# Patient Record
Sex: FEMALE | NOT HISPANIC OR LATINO | Employment: FULL TIME | URBAN - METROPOLITAN AREA
[De-identification: names, ages, dates, MRNs, and addresses within clinical notes are randomized per-mention and may not be internally consistent; named-entity substitution may affect disease eponyms.]

---

## 2023-04-03 ENCOUNTER — APPOINTMENT (OUTPATIENT)
Dept: PRIMARY CARE | Facility: CLINIC | Age: 33
End: 2023-04-03

## 2023-05-02 ENCOUNTER — OFFICE VISIT (OUTPATIENT)
Dept: PRIMARY CARE | Facility: CLINIC | Age: 33
End: 2023-05-02
Payer: COMMERCIAL

## 2023-05-02 ENCOUNTER — LAB (OUTPATIENT)
Dept: LAB | Facility: LAB | Age: 33
End: 2023-05-02
Payer: COMMERCIAL

## 2023-05-02 VITALS
WEIGHT: 134.4 LBS | TEMPERATURE: 97.8 F | SYSTOLIC BLOOD PRESSURE: 118 MMHG | HEIGHT: 61 IN | HEART RATE: 67 BPM | DIASTOLIC BLOOD PRESSURE: 80 MMHG | BODY MASS INDEX: 25.37 KG/M2 | OXYGEN SATURATION: 98 %

## 2023-05-02 DIAGNOSIS — Z90.721 S/P REMOVAL OF LEFT OVARY: ICD-10-CM

## 2023-05-02 DIAGNOSIS — Z00.00 HEALTH MAINTENANCE EXAMINATION: ICD-10-CM

## 2023-05-02 DIAGNOSIS — Z87.59 HISTORY OF ECTOPIC PREGNANCY: ICD-10-CM

## 2023-05-02 DIAGNOSIS — Z00.00 HEALTH MAINTENANCE EXAMINATION: Primary | ICD-10-CM

## 2023-05-02 DIAGNOSIS — Z23 NEED FOR TDAP VACCINATION: ICD-10-CM

## 2023-05-02 LAB
ALANINE AMINOTRANSFERASE (SGPT) (U/L) IN SER/PLAS: 11 U/L (ref 7–45)
ALBUMIN (G/DL) IN SER/PLAS: 4.5 G/DL (ref 3.4–5)
ALKALINE PHOSPHATASE (U/L) IN SER/PLAS: 69 U/L (ref 33–110)
ANION GAP IN SER/PLAS: 12 MMOL/L (ref 10–20)
APPEARANCE, URINE: CLEAR
ASPARTATE AMINOTRANSFERASE (SGOT) (U/L) IN SER/PLAS: 14 U/L (ref 9–39)
BASOPHILS (10*3/UL) IN BLOOD BY AUTOMATED COUNT: 0.06 X10E9/L (ref 0–0.1)
BASOPHILS/100 LEUKOCYTES IN BLOOD BY AUTOMATED COUNT: 0.9 % (ref 0–2)
BILIRUBIN TOTAL (MG/DL) IN SER/PLAS: 0.6 MG/DL (ref 0–1.2)
BILIRUBIN, URINE: NEGATIVE
BLOOD, URINE: NEGATIVE
CALCIUM (MG/DL) IN SER/PLAS: 9.9 MG/DL (ref 8.6–10.6)
CARBON DIOXIDE, TOTAL (MMOL/L) IN SER/PLAS: 29 MMOL/L (ref 21–32)
CHLORIDE (MMOL/L) IN SER/PLAS: 103 MMOL/L (ref 98–107)
CHOLESTEROL (MG/DL) IN SER/PLAS: 208 MG/DL (ref 0–199)
CHOLESTEROL IN HDL (MG/DL) IN SER/PLAS: 93.3 MG/DL
CHOLESTEROL/HDL RATIO: 2.2
COLOR, URINE: COLORLESS
CREATININE (MG/DL) IN SER/PLAS: 0.56 MG/DL (ref 0.5–1.05)
EOSINOPHILS (10*3/UL) IN BLOOD BY AUTOMATED COUNT: 0.24 X10E9/L (ref 0–0.7)
EOSINOPHILS/100 LEUKOCYTES IN BLOOD BY AUTOMATED COUNT: 3.4 % (ref 0–6)
ERYTHROCYTE DISTRIBUTION WIDTH (RATIO) BY AUTOMATED COUNT: 12.2 % (ref 11.5–14.5)
ERYTHROCYTE MEAN CORPUSCULAR HEMOGLOBIN CONCENTRATION (G/DL) BY AUTOMATED: 30.7 G/DL (ref 32–36)
ERYTHROCYTE MEAN CORPUSCULAR VOLUME (FL) BY AUTOMATED COUNT: 96 FL (ref 80–100)
ERYTHROCYTES (10*6/UL) IN BLOOD BY AUTOMATED COUNT: 4.49 X10E12/L (ref 4–5.2)
GFR FEMALE: >90 ML/MIN/1.73M2
GLUCOSE (MG/DL) IN SER/PLAS: 82 MG/DL (ref 74–99)
GLUCOSE, URINE: NEGATIVE MG/DL
HEMATOCRIT (%) IN BLOOD BY AUTOMATED COUNT: 43.3 % (ref 36–46)
HEMOGLOBIN (G/DL) IN BLOOD: 13.3 G/DL (ref 12–16)
IMMATURE GRANULOCYTES/100 LEUKOCYTES IN BLOOD BY AUTOMATED COUNT: 0.1 % (ref 0–0.9)
KETONES, URINE: NEGATIVE MG/DL
LDL: 105 MG/DL (ref 0–99)
LEUKOCYTE ESTERASE, URINE: NEGATIVE
LEUKOCYTES (10*3/UL) IN BLOOD BY AUTOMATED COUNT: 7 X10E9/L (ref 4.4–11.3)
LYMPHOCYTES (10*3/UL) IN BLOOD BY AUTOMATED COUNT: 2.46 X10E9/L (ref 1.2–4.8)
LYMPHOCYTES/100 LEUKOCYTES IN BLOOD BY AUTOMATED COUNT: 35 % (ref 13–44)
MONOCYTES (10*3/UL) IN BLOOD BY AUTOMATED COUNT: 0.76 X10E9/L (ref 0.1–1)
MONOCYTES/100 LEUKOCYTES IN BLOOD BY AUTOMATED COUNT: 10.8 % (ref 2–10)
NEUTROPHILS (10*3/UL) IN BLOOD BY AUTOMATED COUNT: 3.49 X10E9/L (ref 1.2–7.7)
NEUTROPHILS/100 LEUKOCYTES IN BLOOD BY AUTOMATED COUNT: 49.8 % (ref 40–80)
NITRITE, URINE: NEGATIVE
NRBC (PER 100 WBCS) BY AUTOMATED COUNT: 0 /100 WBC (ref 0–0)
PH, URINE: 7 (ref 5–8)
PLATELETS (10*3/UL) IN BLOOD AUTOMATED COUNT: 380 X10E9/L (ref 150–450)
POTASSIUM (MMOL/L) IN SER/PLAS: 4.4 MMOL/L (ref 3.5–5.3)
PROTEIN TOTAL: 7.6 G/DL (ref 6.4–8.2)
PROTEIN, URINE: NEGATIVE MG/DL
SODIUM (MMOL/L) IN SER/PLAS: 140 MMOL/L (ref 136–145)
SPECIFIC GRAVITY, URINE: 1 (ref 1–1.03)
THYROTROPIN (MIU/L) IN SER/PLAS BY DETECTION LIMIT <= 0.05 MIU/L: 1.71 MIU/L (ref 0.44–3.98)
TRIGLYCERIDE (MG/DL) IN SER/PLAS: 51 MG/DL (ref 0–149)
UREA NITROGEN (MG/DL) IN SER/PLAS: 10 MG/DL (ref 6–23)
UROBILINOGEN, URINE: <2 MG/DL (ref 0–1.9)
VLDL: 10 MG/DL (ref 0–40)

## 2023-05-02 PROCEDURE — 81003 URINALYSIS AUTO W/O SCOPE: CPT

## 2023-05-02 PROCEDURE — 36415 COLL VENOUS BLD VENIPUNCTURE: CPT

## 2023-05-02 PROCEDURE — 90471 IMMUNIZATION ADMIN: CPT | Performed by: NURSE PRACTITIONER

## 2023-05-02 PROCEDURE — 90715 TDAP VACCINE 7 YRS/> IM: CPT | Performed by: NURSE PRACTITIONER

## 2023-05-02 PROCEDURE — 99385 PREV VISIT NEW AGE 18-39: CPT | Performed by: NURSE PRACTITIONER

## 2023-05-02 PROCEDURE — 80053 COMPREHEN METABOLIC PANEL: CPT

## 2023-05-02 PROCEDURE — 1036F TOBACCO NON-USER: CPT | Performed by: NURSE PRACTITIONER

## 2023-05-02 PROCEDURE — 80061 LIPID PANEL: CPT

## 2023-05-02 PROCEDURE — 84443 ASSAY THYROID STIM HORMONE: CPT

## 2023-05-02 PROCEDURE — 85025 COMPLETE CBC W/AUTO DIFF WBC: CPT

## 2023-05-02 RX ORDER — MULTIVITAMIN
1 TABLET ORAL DAILY
COMMUNITY

## 2023-05-02 ASSESSMENT — ENCOUNTER SYMPTOMS
COUGH: 0
DYSURIA: 0
ARTHRALGIAS: 0
CONSTIPATION: 0
FATIGUE: 0
ABDOMINAL PAIN: 0
WHEEZING: 0
FEVER: 0
ACTIVITY CHANGE: 0
NERVOUS/ANXIOUS: 0
ABDOMINAL DISTENTION: 0
VOMITING: 0
HEMATURIA: 0
NUMBNESS: 0
WOUND: 0
SHORTNESS OF BREATH: 0
EYE PAIN: 0
EYE ITCHING: 0
PALPITATIONS: 0
FREQUENCY: 0
DIARRHEA: 0
APPETITE CHANGE: 0

## 2023-05-02 NOTE — PATIENT INSTRUCTIONS
Obtain fasting blood work as ordered-- we will call you with the results   You received Tetanus Vaccine in office today   Recommend you send us your immunization records   Referral given to gynecologist -- please call their office and schedule an appointment  It is important that you get regular PAP smear   Follow up in 1 year for annual physical exam or sooner if you develop new symptoms

## 2023-05-02 NOTE — PROGRESS NOTES
Subjective   Chief Complaint: New Patient Visit.    [facility]   Ma. Ricardo Carrillo Jordin Lazcano is a 33 y.o. female who presents for New Patient Visit.    Patient presents to establish care and physical exam. She reports feeling overall well today.   She reports cloudy urine but denies frequency, urgency, hematuria. She is concerned for a UTI.     She [place] the [place] in [place] from [place] with her  and two children (ages 10 & 8).     She reports G 3 P 2  She had 2 C sections and her first pregnancy ended in ectopic pregnancy requiring left oopherectomy.   She has never had a PAP smear and is interested in a referral to GYN.   Denies any chance of pregnancy. Her LMP was 2 weeks ago, reports normal menstruation.    Currently employed as as nurse.   Denies regular exercise. Reports eating most meals in the home.   Denies tobacco use. Reports occasional (once per month) alcohol use.     Has not established with a dentist since [place] to the [place]/     Patient will bring in a copy of her immunizations. Is due for her Tdap vaccine.     Patient denies fever, chills, nausea, vomiting, diarrhea, chest pain, heart palpations, or shortness of breath.       Review of Systems   Constitutional:  Negative for activity change, appetite change, fatigue and fever.   HENT:  Negative for congestion.    Eyes:  Negative for pain and itching.   Respiratory:  Negative for cough, shortness of breath and wheezing.    Cardiovascular:  Negative for chest pain, palpitations and leg swelling.   Gastrointestinal:  Negative for abdominal distention, abdominal pain, constipation, diarrhea and vomiting.   Genitourinary:  Negative for dysuria, frequency, hematuria, urgency, vaginal bleeding, vaginal discharge and vaginal pain.        Cloudy urine    Musculoskeletal:  Negative for arthralgias and gait problem.   Skin:  Negative for rash and wound.   Neurological:  Negative for numbness.   Psychiatric/Behavioral:  The patient is not  "nervous/anxious.        Objective   /80   Pulse 67   Temp 36.6 °C (97.8 °F) (Temporal)   Ht 1.537 m (5' 0.5\")   Wt 61 kg (134 lb 6.4 oz)   LMP 04/17/2023 (Approximate)   SpO2 98%   BMI 25.82 kg/m²   BSA Body surface area is 1.61 meters squared.      Physical Exam  Constitutional:       Appearance: Normal appearance.   HENT:      Head: Normocephalic.      Right Ear: Tympanic membrane and external ear normal.      Left Ear: Tympanic membrane and external ear normal.      Nose: Nose normal.      Mouth/Throat:      Mouth: Mucous membranes are moist.      Pharynx: Oropharynx is clear.   Eyes:      Extraocular Movements: Extraocular movements intact.      Conjunctiva/sclera: Conjunctivae normal.      Pupils: Pupils are equal, round, and reactive to light.   Cardiovascular:      Rate and Rhythm: Normal rate and regular rhythm.      Pulses: Normal pulses.      Heart sounds: Normal heart sounds.   Pulmonary:      Effort: Pulmonary effort is normal.      Breath sounds: Normal breath sounds.   Abdominal:      General: Bowel sounds are normal.      Palpations: Abdomen is soft.   Musculoskeletal:         General: Normal range of motion.      Cervical back: Normal range of motion.      Right lower leg: No edema.      Left lower leg: No edema.   Skin:     General: Skin is warm and dry.   Neurological:      General: No focal deficit present.      Mental Status: She is alert and oriented to person, place, and time.   Psychiatric:         Mood and Affect: Mood is not anxious or depressed.       No results found for any previous visit.     Current Outpatient Medications on File Prior to Visit   Medication Sig Dispense Refill    ascorbic acid (VITAMIN C ORAL) Take by mouth.      multivitamin tablet Take 1 tablet by mouth once daily.       No current facility-administered medications on file prior to visit.     No images are attached to the encounter.            Assessment/Plan   Problem List Items Addressed This Visit  "         Other    History of ectopic pregnancy     During first pregnancy over 10 years ago   S/p removal of left ovary          S/P removal of left ovary    Health maintenance examination - Primary     - Never had pap smear done per patient --- referral given to OBGYN   - BW ordered   - Tdap given in office today   - patient to have immunization records sent to our office          Relevant Orders    Comprehensive Metabolic Panel    Lipid Panel    CBC and Auto Differential    TSH with reflex to Free T4 if abnormal    Referral to Gynecology    Urinalysis with Reflex Microscopic     Other Visit Diagnoses       Need for Tdap vaccination        Relevant Orders    Tdap vaccine, age 10 years and older  (BOOSTRIX) (Completed)

## 2023-05-02 NOTE — ASSESSMENT & PLAN NOTE
- Never had pap smear done per patient --- referral given to OBGYN   - BW ordered   - Tdap given in office today   - patient to have immunization records sent to our office

## 2023-05-12 ENCOUNTER — OFFICE VISIT (OUTPATIENT)
Dept: PRIMARY CARE | Facility: CLINIC | Age: 33
End: 2023-05-12
Payer: COMMERCIAL

## 2023-05-12 VITALS
TEMPERATURE: 97.3 F | OXYGEN SATURATION: 98 % | HEIGHT: 61 IN | SYSTOLIC BLOOD PRESSURE: 120 MMHG | WEIGHT: 136 LBS | DIASTOLIC BLOOD PRESSURE: 78 MMHG | BODY MASS INDEX: 25.68 KG/M2 | RESPIRATION RATE: 16 BRPM | HEART RATE: 70 BPM

## 2023-05-12 DIAGNOSIS — E78.00 ELEVATED LDL CHOLESTEROL LEVEL: Primary | ICD-10-CM

## 2023-05-12 PROBLEM — Z90.721 S/P REMOVAL OF LEFT OVARY: Status: RESOLVED | Noted: 2023-05-02 | Resolved: 2023-05-12

## 2023-05-12 PROBLEM — Z90.721 HISTORY OF LEFT OOPHORECTOMY: Status: ACTIVE | Noted: 2023-05-02

## 2023-05-12 PROCEDURE — 1036F TOBACCO NON-USER: CPT | Performed by: NURSE PRACTITIONER

## 2023-05-12 PROCEDURE — 99213 OFFICE O/P EST LOW 20 MIN: CPT | Performed by: NURSE PRACTITIONER

## 2023-05-12 ASSESSMENT — ENCOUNTER SYMPTOMS
NAUSEA: 0
LOSS OF SENSATION IN FEET: 0
SHORTNESS OF BREATH: 0
ABDOMINAL PAIN: 0
DIARRHEA: 0
COUGH: 0
DEPRESSION: 0
FEVER: 0
OCCASIONAL FEELINGS OF UNSTEADINESS: 0
VOMITING: 0
CHILLS: 0

## 2023-05-12 ASSESSMENT — PATIENT HEALTH QUESTIONNAIRE - PHQ9
2. FEELING DOWN, DEPRESSED OR HOPELESS: NOT AT ALL
1. LITTLE INTEREST OR PLEASURE IN DOING THINGS: NOT AT ALL
SUM OF ALL RESPONSES TO PHQ9 QUESTIONS 1 AND 2: 0

## 2023-05-12 NOTE — PROGRESS NOTES
"Subjective   Chief Complaint: Follow-up (Review blood work ).    HPI   Ma. Ricardo Gerardo Lazcano is a 33 y.o. female who presents for Follow-up (Review blood work ).    Patient presents for follow up regarding blood work. She would like to go over and discuss her blood work,   She is concerned because her LDL was slightly elevated   Advised patient that this is only slightly elevated and will improve with diet and exercise.   She denies any new symptoms today.  Patient denies fever, chills, nausea, vomiting, diarrhea, chest pain, heart palpations, or shortness of breath.       Review of Systems   Constitutional:  Negative for chills and fever.   Respiratory:  Negative for cough and shortness of breath.    Cardiovascular:  Negative for chest pain.   Gastrointestinal:  Negative for abdominal pain, diarrhea, nausea and vomiting.       Objective   /78   Pulse 70   Temp 36.3 °C (97.3 °F)   Resp 16   Ht 1.549 m (5' 1\")   Wt 61.7 kg (136 lb)   LMP 04/17/2023 (Approximate)   SpO2 98%   BMI 25.70 kg/m²   BSA Body surface area is 1.63 meters squared.      Physical Exam  Constitutional:       Appearance: Normal appearance.   Neurological:      Mental Status: She is alert.   Psychiatric:         Mood and Affect: Mood normal.         Behavior: Behavior normal.       Lab on 05/02/2023   Component Date Value Ref Range Status    Glucose 05/02/2023 82  74 - 99 mg/dL Final    Sodium 05/02/2023 140  136 - 145 mmol/L Final    Potassium 05/02/2023 4.4  3.5 - 5.3 mmol/L Final    Chloride 05/02/2023 103  98 - 107 mmol/L Final    Bicarbonate 05/02/2023 29  21 - 32 mmol/L Final    Anion Gap 05/02/2023 12  10 - 20 mmol/L Final    Urea Nitrogen 05/02/2023 10  6 - 23 mg/dL Final    Creatinine 05/02/2023 0.56  0.50 - 1.05 mg/dL Final    GFR Female 05/02/2023 >90  >90 mL/min/1.73m2 Final     CALCULATIONS OF ESTIMATED GFR ARE PERFORMED   USING THE 2021 CKD-EPI STUDY REFIT EQUATION   WITHOUT THE RACE VARIABLE FOR THE " IDMS-TRACEABLE   CREATININE METHODS.    https://jasn.asnjournals.org/content/early/2021/09/22/ASN.9443713741    Calcium 05/02/2023 9.9  8.6 - 10.6 mg/dL Final    Albumin 05/02/2023 4.5  3.4 - 5.0 g/dL Final    Alkaline Phosphatase 05/02/2023 69  33 - 110 U/L Final    Total Protein 05/02/2023 7.6  6.4 - 8.2 g/dL Final    AST 05/02/2023 14  9 - 39 U/L Final    Total Bilirubin 05/02/2023 0.6  0.0 - 1.2 mg/dL Final    ALT (SGPT) 05/02/2023 11  7 - 45 U/L Final     Patients treated with Sulfasalazine may generate    falsely decreased results for ALT.    Cholesterol 05/02/2023 208 (H)  0 - 199 mg/dL Final    .      AGE      DESIRABLE   BORDERLINE HIGH   HIGH     0-19 Y     0 - 169       170 - 199     >/= 200    20-24 Y     0 - 189       190 - 224     >/= 225         >24 Y     0 - 199       200 - 239     >/= 240   **All ranges are based on fasting samples. Specific   therapeutic targets will vary based on patient-specific   cardiac risk.  .   Pediatric guidelines reference:Pediatrics 2011, 128(S5).   Adult guidelines reference: NCEP ATPIII Guidelines,     YVAN 2001, 258:2486-97  .   Venipuncture immediately after or during the    administration of Metamizole may lead to falsely   low results. Testing should be performed immediately   prior to Metamizole dosing.    HDL 05/02/2023 93.3  mg/dL Final    .      AGE      VERY LOW   LOW     NORMAL    HIGH       0-19 Y       < 35   < 40     40-45     ----    20-24 Y       ----   < 40       >45     ----      >24 Y       ----   < 40     40-60      >60  .    Cholesterol/HDL Ratio 05/02/2023 2.2   Final    REF VALUES  DESIRABLE  < 3.4  HIGH RISK  > 5.0    LDL 05/02/2023 105 (H)  0 - 99 mg/dL Final    .                           NEAR      BORD      AGE      DESIRABLE  OPTIMAL    HIGH     HIGH     VERY HIGH     0-19 Y     0 - 109     ---    110-129   >/= 130     ----    20-24 Y     0 - 119     ---    120-159   >/= 160     ----      >24 Y     0 -  99   100-129  130-159   160-189      >/=190  .    VLDL 05/02/2023 10  0 - 40 mg/dL Final    Triglycerides 05/02/2023 51  0 - 149 mg/dL Final    .      AGE      DESIRABLE   BORDERLINE HIGH   HIGH     VERY HIGH   0 D-90 D    19 - 174         ----         ----        ----  91 D- 9 Y     0 -  74        75 -  99     >/= 100      ----    10-19 Y     0 -  89        90 - 129     >/= 130      ----    20-24 Y     0 - 114       115 - 149     >/= 150      ----         >24 Y     0 - 149       150 - 199    200- 499    >/= 500  .   Venipuncture immediately after or during the    administration of Metamizole may lead to falsely   low results. Testing should be performed immediately   prior to Metamizole dosing.    WBC 05/02/2023 7.0  4.4 - 11.3 x10E9/L Final    nRBC 05/02/2023 0.0  0.0 - 0.0 /100 WBC Final    RBC 05/02/2023 4.49  4.00 - 5.20 x10E12/L Final    Hemoglobin 05/02/2023 13.3  12.0 - 16.0 g/dL Final    Hematocrit 05/02/2023 43.3  36.0 - 46.0 % Final    MCV 05/02/2023 96  80 - 100 fL Final    MCHC 05/02/2023 30.7 (L)  32.0 - 36.0 g/dL Final    Platelets 05/02/2023 380  150 - 450 x10E9/L Final    RDW 05/02/2023 12.2  11.5 - 14.5 % Final    Neutrophils % 05/02/2023 49.8  40.0 - 80.0 % Final    Immature Granulocytes %, Automated 05/02/2023 0.1  0.0 - 0.9 % Final     Immature Granulocyte Count (IG) includes promyelocytes,    myelocytes and metamyelocytes but does not include bands.   Percent differential counts (%) should be interpreted in the   context of the absolute cell counts (cells/L).    Lymphocytes % 05/02/2023 35.0  13.0 - 44.0 % Final    Monocytes % 05/02/2023 10.8  2.0 - 10.0 % Final    Eosinophils % 05/02/2023 3.4  0.0 - 6.0 % Final    Basophils % 05/02/2023 0.9  0.0 - 2.0 % Final    Neutrophils Absolute 05/02/2023 3.49  1.20 - 7.70 x10E9/L Final    Lymphocytes Absolute 05/02/2023 2.46  1.20 - 4.80 x10E9/L Final    Monocytes Absolute 05/02/2023 0.76  0.10 - 1.00 x10E9/L Final    Eosinophils Absolute 05/02/2023 0.24  0.00 - 0.70 x10E9/L Final     Basophils Absolute 05/02/2023 0.06  0.00 - 0.10 x10E9/L Final    TSH 05/02/2023 1.71  0.44 - 3.98 mIU/L Final     TSH testing is performed using different testing    methodology at [facility] than at other    [facility]. Direct result comparisons should    only be made within the same method.    Color, Urine 05/02/2023 COLORLESS  STRAW,YELLOW Final    Appearance, Urine 05/02/2023 CLEAR  CLEAR Final    Specific Gravity, Urine 05/02/2023 1.004 (L)  1.005 - 1.035 Final    pH, Urine 05/02/2023 7.0  5.0 - 8.0 Final    Protein, Urine 05/02/2023 NEGATIVE  NEGATIVE mg/dL Final    Glucose, Urine 05/02/2023 NEGATIVE  NEGATIVE mg/dL Final    Blood, Urine 05/02/2023 NEGATIVE  NEGATIVE Final    Ketones, Urine 05/02/2023 NEGATIVE  NEGATIVE mg/dL Final    Bilirubin, Urine 05/02/2023 NEGATIVE  NEGATIVE Final    Urobilinogen, Urine 05/02/2023 <2.0  0.0 - 1.9 mg/dL Final    Nitrite, Urine 05/02/2023 NEGATIVE  NEGATIVE Final    Leukocyte Esterase, Urine 05/02/2023 NEGATIVE  NEGATIVE Final     Current Outpatient Medications on File Prior to Visit   Medication Sig Dispense Refill    ascorbic acid (VITAMIN C ORAL) Take by mouth.      multivitamin tablet Take 1 tablet by mouth once daily.       No current facility-administered medications on file prior to visit.     No images are attached to the encounter.            Assessment/Plan   Problem List Items Addressed This Visit          Other    Elevated LDL cholesterol level - Primary     BW reviewed   Patient advised to exercise and adhere to mediterranean based diet.

## 2023-05-12 NOTE — PATIENT INSTRUCTIONS
Your blood work overall looks good  Keep up the good work!   I do recommend exercise 150 minutes/week and incorporation of mediterranean based diet   Follow up for annual physical exam

## 2023-11-13 ENCOUNTER — APPOINTMENT (OUTPATIENT)
Dept: PRIMARY CARE | Facility: CLINIC | Age: 33
End: 2023-11-13
Payer: COMMERCIAL